# Patient Record
Sex: FEMALE | Race: WHITE
[De-identification: names, ages, dates, MRNs, and addresses within clinical notes are randomized per-mention and may not be internally consistent; named-entity substitution may affect disease eponyms.]

---

## 2017-06-08 ENCOUNTER — HOSPITAL ENCOUNTER (OUTPATIENT)
Dept: HOSPITAL 10 - GIL | Age: 52
Discharge: HOME | End: 2017-06-08
Attending: INTERNAL MEDICINE
Payer: COMMERCIAL

## 2017-06-08 VITALS — DIASTOLIC BLOOD PRESSURE: 73 MMHG | RESPIRATION RATE: 12 BRPM | SYSTOLIC BLOOD PRESSURE: 146 MMHG | HEART RATE: 85 BPM

## 2017-06-08 VITALS
WEIGHT: 144.84 LBS | HEIGHT: 60 IN | BODY MASS INDEX: 28.44 KG/M2 | BODY MASS INDEX: 28.44 KG/M2 | HEIGHT: 60 IN | WEIGHT: 144.84 LBS

## 2017-06-08 VITALS — RESPIRATION RATE: 16 BRPM | SYSTOLIC BLOOD PRESSURE: 141 MMHG | HEART RATE: 72 BPM | DIASTOLIC BLOOD PRESSURE: 76 MMHG

## 2017-06-08 DIAGNOSIS — Z12.11: Primary | ICD-10-CM

## 2017-06-08 DIAGNOSIS — K64.8: ICD-10-CM

## 2017-06-08 PROCEDURE — 45378 DIAGNOSTIC COLONOSCOPY: CPT

## 2017-06-08 NOTE — GILP
DATE OF PROCEDURE:  06/08/2017

 

 

NAME OF PROCEDURE:  Colonoscopy.

 

SURGEON:  Lavinia Yuen MD.

 

PREOPERATIVE DIAGNOSIS:  Screening colonoscopy.

 

POSTOPERATIVE DIAGNOSES:

1.  Colonoscopy all the way to the cecum.

2.  Internal hemorrhoids.

3.  No colon neoplasm was identified.

 

INDICATION FOR THE PROCEDURE:  Ms. Willa Morris is a 51-year-old female patient who was schedule
d for screening colonoscopy.

 

The procedure and possible complications are well explained to the patient.  The patient understood 
and consented to the procedure.

 

DESCRIPTION OF PROCEDURE:  Under the influence of fentanyl and Versed, the colonoscope was carefully
 introduced in the rectum and under direct vision, it was advanced all the way to the cecum.

 

FINDINGS:  The patient had internal hemorrhoids.  No colon neoplasm was identified.

 

She tolerated the procedure very well and there was no complication from the procedure.  At the end 
of the procedure, she was awake with stable vital signs, and she was discharged home to the care of 
her family.

 

IMPRESSION:

1.  Colonoscopy all the way to the cecum.

2.  Internal hemorrhoids.

3.  No colon neoplasm was identified.

 

PLAN:  Next screening colonoscopy in 10 years.

 

 

Dictated By: LAVINIA MCCOY/SANGEETA

DD:    06/08/2017 08:31:53

DT:    06/08/2017 09:27:35

Conf#: [id number]

DID#:  [id number]

CC: Lidya John;*EndCC*

## 2017-08-14 ENCOUNTER — HOSPITAL ENCOUNTER (EMERGENCY)
Dept: HOSPITAL 10 - E/R | Age: 52
Discharge: HOME | End: 2017-08-14
Payer: COMMERCIAL

## 2017-08-14 VITALS
WEIGHT: 148.81 LBS | BODY MASS INDEX: 27.38 KG/M2 | HEIGHT: 62 IN | BODY MASS INDEX: 27.38 KG/M2 | HEIGHT: 62 IN | WEIGHT: 148.81 LBS

## 2017-08-14 VITALS
RESPIRATION RATE: 18 BRPM | HEART RATE: 81 BPM | TEMPERATURE: 98.2 F | DIASTOLIC BLOOD PRESSURE: 70 MMHG | SYSTOLIC BLOOD PRESSURE: 129 MMHG

## 2017-08-14 DIAGNOSIS — R10.13: Primary | ICD-10-CM

## 2017-08-14 PROCEDURE — 99283 EMERGENCY DEPT VISIT LOW MDM: CPT

## 2017-08-14 NOTE — ERD
ER Documentation


Chief Complaint


Date/Time


DATE: 8/14/17 


TIME: 14:02


Chief Complaint


Pt with AP X 6 days.





HPI


51-year-old female with no significant past medical history presenting with 

epigastric pain intermittent for the past 6 days.  She describes as burning, 

nonradiating, worse after eating, better when not eating.  No associated 

hematemesis, nausea, vomiting, hematochezia, or melena.  Currently the pain is 

a 2 out of 10.  She denies any associated chest pain or shortness of breath.  

She does feel acid reflux at times.  She drinks coffee daily but denies any 

NSAID use or spicy foods.





ROS


All systems reviewed and are negative except as per history of present illness.





Medications


Home Meds


Active Scripts


Mag Hydrox/Al Hydrox/Simeth (Maalox Advanced Suspension) 355 Ml Oral.susp, 30 

ML PO PC MEALS BEDTIME Y for GASTROINTESTINAL UPSET, #355


   Prov:SANDY ORR MD         8/14/17


Famotidine* (Pepcid*) 20 Mg Tablet, 20 MG PO BID for 14 Days, TAB


   Prov:SANDY ORR MD         8/14/17


Reported Medications


[Mag Ox ]   No Conflict Check


   6/8/17


Cholecalciferol* (Vitamin D3*) 1,000 Unit Tablet, 1000 UNIT PO DAILY, TAB


   6/8/17





Allergies


Allergies:  


Coded Allergies:  


     No Known Allergy (Unverified , 6/8/17)





PMhx/Soc


History of Surgery:  Yes (BREAST AUGMENTATION)


Anesthesia Reaction:  No


Hx Neurological Disorder:  No


Hx Respiratory Disorders:  No


Hx Cardiac Disorders:  Yes (STATES HIGH BP AT TIMES)


Hx Psychiatric Problems:  Yes (anxiety)


Hx Miscellaneous Medical Probl:  No


Hx Alcohol Use:  No


Hx Substance Use:  No


Hx Tobacco Use:  No


Smoking Status:  Never smoker





FmHx


Family History:  No diabetes





Physical Exam


Vitals





Vital Signs








  Date Time  Temp Pulse Resp B/P Pulse Ox O2 Delivery O2 Flow Rate FiO2


 


8/14/17 13:06 98.0 78 18 125/64 99 Room Air  


 


8/14/17 11:31 97.7 74 18 127/60 98   








Physical Exam


Const: Well-appearing, pleasant, no apparent distress


Head:   Atraumatic 


Eyes:    Normal Conjunctiva


ENT:    Normal External Ears, Nose and Mouth.


Neck:               Full range of motion..~ No meningismus.


Resp:    Clear to auscultation bilaterally


Cardio:    Regular rate and rhythm, no murmurs.  2+ distal pulses


Abd:    Soft, non tender, non distended.  Negative Santana sign.  No McBurney's 

point tenderness.  Normal bowel sounds


Skin:    No petechiae or rashes


Back:    No midline or flank tenderness


Ext:    No cyanosis, or edema


Neur:    Awake and alert


Psych:    Normal Mood and Affect


Results 24 hrs





 Current Medications








 Medications


  (Trade)  Dose


 Ordered  Sig/Leslie


 Route


 PRN Reason  Start Time


 Stop Time Status Last Admin


Dose Admin


 


 Famotidine


  (Pepcid)  20 mg  ONCE  ONCE


 PO


   8/14/17 13:30


 8/14/17 13:31 DC 8/14/17 13:45


 


 


 Miscellaneous


 Medication


  (Gi Cocktail (2))  40 ml  ONCE  STAT


 PO


   8/14/17 13:28


 8/14/17 13:29 DC 8/14/17 13:45


 


 


 Simethicone


  (Mylicon)  160 mg  ONCE  ONCE


 PO


   8/14/17 14:30


 8/14/17 14:31   


 











Procedures/MDM


Patient is presenting with epigastric burning abdominal pain after eating.  She 

is hemodynamically stable and afebrile.  I have a low suspicion for acute 

surgical abdomen, pancreatitis, choledocholithiasis, acute coronary syndrome, 

aortic dissection or rupture.  GI cocktail was given with resolution of her 

symptoms.  At this time I do not think the patient needs any further workup.  

Diet modification was discussed.  I will start her on Pepcid 20 mg twice daily 

for 2 weeks in addition to Maalox as needed.  Follow-up with PCP was 

recommended in 2 days.  She was encouraged to return for any worsening symptoms 

and return precautions were discussed.





Departure


Diagnosis:  


 Primary Impression:  


 Epigastric pain


Condition:  Stable











SANDY ORR MD Aug 14, 2017 14:02

## 2018-01-11 ENCOUNTER — HOSPITAL ENCOUNTER (EMERGENCY)
Age: 53
Discharge: HOME | End: 2018-01-11

## 2018-01-11 ENCOUNTER — HOSPITAL ENCOUNTER (EMERGENCY)
Dept: HOSPITAL 91 - FTE | Age: 53
Discharge: HOME | End: 2018-01-11
Payer: COMMERCIAL

## 2018-01-11 DIAGNOSIS — H10.12: Primary | ICD-10-CM

## 2018-01-11 PROCEDURE — 99283 EMERGENCY DEPT VISIT LOW MDM: CPT

## 2018-03-21 ENCOUNTER — HOSPITAL ENCOUNTER (EMERGENCY)
Age: 53
Discharge: HOME | End: 2018-03-21

## 2018-03-21 ENCOUNTER — HOSPITAL ENCOUNTER (EMERGENCY)
Dept: HOSPITAL 91 - E/R | Age: 53
Discharge: HOME | End: 2018-03-21
Payer: COMMERCIAL

## 2018-03-21 DIAGNOSIS — R10.12: Primary | ICD-10-CM

## 2018-03-21 DIAGNOSIS — I10: ICD-10-CM

## 2018-03-21 PROCEDURE — 99283 EMERGENCY DEPT VISIT LOW MDM: CPT

## 2018-03-21 RX ADMIN — ALUMINUM HYDROXIDE, MAGNESIUM HYDROXIDE, DIMETHICONE 1 ML: 200; 200; 20 SUSPENSION ORAL at 16:58

## 2018-03-21 RX ADMIN — PANTOPRAZOLE SODIUM 1 MG: 40 TABLET, DELAYED RELEASE ORAL at 16:58

## 2018-07-14 ENCOUNTER — HOSPITAL ENCOUNTER (EMERGENCY)
Dept: HOSPITAL 91 - FTE | Age: 53
Discharge: HOME | End: 2018-07-14
Payer: COMMERCIAL

## 2018-07-14 ENCOUNTER — HOSPITAL ENCOUNTER (EMERGENCY)
Age: 53
Discharge: HOME | End: 2018-07-14

## 2018-07-14 DIAGNOSIS — F41.9: Primary | ICD-10-CM

## 2018-07-14 DIAGNOSIS — R07.9: ICD-10-CM

## 2018-07-14 PROCEDURE — 93005 ELECTROCARDIOGRAM TRACING: CPT

## 2018-07-14 PROCEDURE — 99283 EMERGENCY DEPT VISIT LOW MDM: CPT

## 2018-07-14 RX ADMIN — LORAZEPAM 1 MG: 1 TABLET ORAL at 10:23
